# Patient Record
Sex: FEMALE | Race: WHITE | URBAN - METROPOLITAN AREA
[De-identification: names, ages, dates, MRNs, and addresses within clinical notes are randomized per-mention and may not be internally consistent; named-entity substitution may affect disease eponyms.]

---

## 2019-04-12 ENCOUNTER — OFFICE VISIT (OUTPATIENT)
Dept: INTERNAL MEDICINE CLINIC | Age: 54
End: 2019-04-12

## 2019-04-12 VITALS
BODY MASS INDEX: 23.92 KG/M2 | RESPIRATION RATE: 18 BRPM | HEIGHT: 63 IN | OXYGEN SATURATION: 97 % | HEART RATE: 87 BPM | TEMPERATURE: 98 F | WEIGHT: 135 LBS | DIASTOLIC BLOOD PRESSURE: 92 MMHG | SYSTOLIC BLOOD PRESSURE: 138 MMHG

## 2019-04-12 DIAGNOSIS — E78.2 MIXED HYPERLIPIDEMIA: ICD-10-CM

## 2019-04-12 DIAGNOSIS — D75.89 MACROCYTOSIS WITHOUT ANEMIA: ICD-10-CM

## 2019-04-12 DIAGNOSIS — M15.9 OSTEOARTHRITIS OF MULTIPLE JOINTS, UNSPECIFIED OSTEOARTHRITIS TYPE: ICD-10-CM

## 2019-04-12 DIAGNOSIS — E55.9 VITAMIN D DEFICIENCY: ICD-10-CM

## 2019-04-12 DIAGNOSIS — A15.0 TUBERCULOSIS OF LUNG: ICD-10-CM

## 2019-04-12 DIAGNOSIS — M54.50 LOW BACK PAIN WITHOUT SCIATICA, UNSPECIFIED BACK PAIN LATERALITY, UNSPECIFIED CHRONICITY: Primary | ICD-10-CM

## 2019-04-12 DIAGNOSIS — Z00.00 ENCOUNTER FOR PREVENTIVE HEALTH EXAMINATION: ICD-10-CM

## 2019-04-12 DIAGNOSIS — G47.00 INSOMNIA, UNSPECIFIED TYPE: ICD-10-CM

## 2019-04-12 PROBLEM — M72.0 DUPUYTREN'S DISEASE OF PALM OF BOTH HANDS: Status: ACTIVE | Noted: 2019-04-12

## 2019-04-12 PROBLEM — F41.9 ANXIETY: Status: ACTIVE | Noted: 2019-04-12

## 2019-04-12 PROBLEM — L40.9 PSORIASIS: Status: ACTIVE | Noted: 2019-04-12

## 2019-04-12 PROBLEM — I10 ESSENTIAL HYPERTENSION: Status: ACTIVE | Noted: 2019-04-12

## 2019-04-12 RX ORDER — CITALOPRAM 10 MG/1
1 TABLET ORAL DAILY
Refills: 5 | COMMUNITY
Start: 2019-03-08

## 2019-04-12 RX ORDER — ZOLPIDEM TARTRATE 5 MG/1
5 TABLET ORAL
Qty: 30 TAB | Refills: 5 | Status: SHIPPED | OUTPATIENT
Start: 2019-04-12

## 2019-04-12 NOTE — PATIENT INSTRUCTIONS
Psoriaform type dermatitis>>Dr. Heaven Mejia    Back Pain>>check for inflammatory arthritis (Rhemaussure, HLAB27, CRP)    BP>>need name of meds and dose    Anxiety>>Citalopram    Screening:     Colonoscopy ([facility])    GYN services are up to date

## 2019-04-12 NOTE — PROGRESS NOTES
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 Her old records have not been forwarded for review and I could not reconcile medication in absence of list and she cannot remember the names of the 2 antihypertensive medications she is on nor the dose and these were not listed in [address] She is concerned about pain along the left flank which occasionally radiates to the right flank. Squatting and bending forward tends to stretch the area and make it feel better. It is not accompanied by hematuria, paresthesias or weakness of either leg or blistering rash. She denies any preceding injury. She has taken Motrin with partial relief. She is concerned that this may be related to psoriatic arthritis. She has a pending appointment with Dr. Valentino Dew fro plaque psoriasis which has affect her scalp, trunk and limbs. She is not currently under treatment for this. She has Dupuytren's disease without contracture and has been told by Dr. Kati Washington that intervention is not required at this time. She finds it hard to make a close  with the right hand. She has chronic anxiety disorder and is on Citalopram that she is cutting in half. She still requires Ambien for sleep and is asking for a refill. She has documented macrocytosis without anemia which has been ascribed to use of ETOH and has been instructed to limit her consumption. She has remote history of pulmonary tuberculosis for which she received definitive treatment. This was acquired while she was in the hospital and filed a workman's claim for it. Her CXR in 2014 only showed scar left lung base.     Past Medical History:   Diagnosis Date    Anxiety     Dupuytren's disease of palm of both hands 4/12/2019    Essential hypertension 4/12/2019    Hypertension     Insomnia     Macrocytosis without anemia 4/12/2019    Psoriasis 4/12/2019    Tuberculosis of lung 4/12/2019    Treated with INH, Rifampin and Ethambutol remotely (work acquired)     Past Surgical History:   Procedure Laterality Date    HX GYN      HX HYSTERECTOMY       Current Outpatient Medications   Medication Sig    zolpidem (AMBIEN) 5 mg tablet Take 1 Tab by mouth nightly as needed for Sleep. Max Daily Amount: 5 mg. Indications: Difficulty Falling Asleep    citalopram (CELEXA) 10 mg tablet Take 1 Tab by mouth daily. No current facility-administered medications for this visit. Allergies and Intolerances: Allergies   Allergen Reactions    Codeine Nausea and Vomiting    Scopolamine Base Photosensitivity     Family History:   Family History   Problem Relation Age of Onset    Hypertension Mother     Arthritis-osteo Mother     Alzheimer Mother     Hypertension Father     Elevated Lipids Father     Other Brother         paraplegic     Social History:   She  reports that she has never smoked. She has never used smokeless tobacco.   Social History     Substance and Sexual Activity   Alcohol Use Yes    Comment: socially     Immunization History:          Flu shot received    Diet:                                      Regular    Exercise:                              Walking    Home Environment:             2 story home    Occupational Risk:              No needlestick exposure    ROS    Physical:   Visit Vitals  BP (!) 138/92 (BP 1 Location: Left arm, BP Patient Position: Sitting)   Pulse 87   Temp 98 °F (36.7 °C) (Oral)   Resp 18   Ht 5' 3\" (1.6 m)   Wt 135 lb (61.2 kg)   SpO2 97%   BMI 23.91 kg/m²          Physical Exam   Constitutional: She is well-developed, well-nourished, and in no distress. HENT:   Right Ear: External ear normal.   Left Ear: External ear normal.   Mouth/Throat: Oropharynx is clear and moist. No oropharyngeal exudate. Eyes: Conjunctivae are normal. No scleral icterus. Neck: No JVD present. No thyromegaly present. Cardiovascular: Normal rate, regular rhythm, normal heart sounds and intact distal pulses.  Exam reveals no gallop. No murmur heard. Pulmonary/Chest: Breath sounds normal. She has no wheezes. She has no rales. Abdominal: Soft. Bowel sounds are normal. She exhibits no mass. There is no tenderness. Musculoskeletal: She exhibits no edema. No CTL spine tenderness, no peripheral synovitis  Tight hamstrings, negative SLR to 45 degress   Lymphadenopathy:     She has no cervical adenopathy. Skin:        Cool toes without cyanosis or ulceration      Review of Data:     MRI lumbar spine report reviewed  Labs:     Impression/Plan:   Patient Active Problem List   Diagnosis  Code    Screening/Wellness Confirm dates of mammogram, DEXA and colonoscopy and immunizations (Flu, Shingrix, Heptavax, Havrix)   Z0.00    Back pain likely myofascial superimposed on DJD and less likely from inflammatory arthropathy   Obtain Rheumassure, CRP and HLA B27, consider repeat imaging M54.5    T12 wedge deformity in absence of known trauma Determine if DEXA has been done, obtain CMP to rule out hyperproteinemia or hypercalcemia   S32.000S      Psoriasis Awaiting opinion from Dr. Rach Reynolds L40.9    Dupuytren's disease of palm of both hands without contracture   No intervention required M72.0    Essential hypertension with mild elevation Monitor BP trend at home and obtain names and dosages of medications currently prescribed I10    RENATE Consider increase dose of Citalopram F41.9    Insomnia Refill Ambien but may benefit from increased dose of Citalopram F51.01    Macrocytosis without anemia   Limit ETOH use, repeat CBC D75.89    Tuberculosis of lung Obtain copy of last CXR, avoid PPD testing A15.0       Carol Owen MD

## 2019-04-12 NOTE — PROGRESS NOTES
Chief Complaint   Patient presents with    Hypertension    Anxiety    Insomnia     1. Have you been to the ER, urgent care clinic since your last visit? Hospitalized since your last visit? No    2. Have you seen or consulted any other health care providers outside of the [address] [place] [address] since your last visit? Include any pap smears or colon screening.  No